# Patient Record
(demographics unavailable — no encounter records)

---

## 2017-09-25 NOTE — CT REPORT
EXAM:

CT HEAD

 

EXAM DATE: 9/25/2017 01:11 AM.

 

CLINICAL HISTORY: Right-sided weakness, now improving.

 

COMPARISON: Brain CT from 08/30/2015.

 

TECHNIQUE: Multiaxial CT images were obtained from the foramen magnum to the vertex. IV contrast: Non
e. Reformats: Coronal.

 

In accordance with CT protocol optimization, one or more of the following dose reduction techniques w
ere utilized for this exam: automated exposure control, adjustment of mA and/or KV based on patient s
ize, or use of iterative reconstructive technique.

 

FINDINGS:

Parenchyma: No intraparenchymal hemorrhage. No evidence of mass, midline shift, or CT findings of acu
te infarction. A small focus of encephalomalacia is again noted in the left frontal lobe. Gray-white 
differentiation is distinct.

 

Extraaxial Spaces: Normal for age. No subdural or epidural collections identified.

 

Ventricles: Normal in size and position.

 

Sinuses: Imaged paranasal sinuses, orbits, and mastoids show no significant abnormality.

 

Bones: No evidence of fracture or calvarial defect.

 

Other: Mild intracranial atherosclerosis is noted.

 

IMPRESSION: 

 

No acute intracranial process.

 

[facility]

Referring Provider Line: [phone]

 

SITE ID: [id number]

## 2017-09-25 NOTE — CT REPORT
EXAM:

CT ANGIOGRAM HEAD

 

EXAM DATE: 9/25/2017 07:01 AM.

 

CLINICAL HISTORY: Right sided weakness.

 

COMPARISON: Noncontrast CT head and CTA of the neck performed separately.

 

TECHNIQUE: Routine helical CTA imaging was performed through the head. IV Contrast: Yes. 80 cc Isovue
 300 Reconstructions: Routine multiplanar 3D MIP reconstructions. NASCET Criteria are used for stenos
is measurements.

 

In accordance with CT protocol optimization, one or more of the following dose reduction techniques w
ere utilized for this exam: automated exposure control, adjustment of mA and/or KV based on patient s
ize, or use of iterative reconstructive technique.

 

FINDINGS:

Noncontrast CT head:

Performed and dictated separately.

 

Postcontrast CT head:

No abnormal enhancement.

 

CTA of the head:

Anterior Circulation: Moderate atherosclerosis right carotid siphon, maximal stenosis 20%. Moderate a
therosclerosis left carotid siphon, maximal stenosis 20%. Severe stenosis mid M1 segment left MCA (fo
r example series 4 image 102, series 8 image 65) with likely greater than 90% stenosis. The right mid
dle cerebral artery, and anterior cerebral arteries (ASH) are patent bilaterally. The anterior commun
icating artery (A-COM) appears patent.  No aneurysms, stenoses, or anatomic anomalies evident.

 

Posterior Circulation: The superior vertebral artery, basilar, and posterior cerebral arteries (PCA) 
are patent. No aneurysms, stenoses, or anomalies evident. The P1 segments of the PCAs bilaterally are
 small. Prominent posterior communicating arteries bilaterally primarily supplied the PCAs which are 
otherwise unremarkable.

 

Other: Patent dural venous sinuses.

 

 

IMPRESSION: 

1. Noncontrast CT head has been performed and dictated separately. No abnormal enhancement on the pos
tcontrast CT head. CTA of the neck is dictated separately.

 

2. Severe stenosis mid M1 segment left MCA (for example series 4 image 102, series 8 image 65) with l
ikely greater than 90% stenosis.

 

3. Moderate atherosclerosis right carotid siphon, maximal stenosis 20%. 

 

4. Moderate atherosclerosis left carotid siphon, maximal stenosis 20%.

 

5. Near fetal origin of the PCAs bilaterally.

 

[facility]

Referring Provider Line: [phone]

 

SITE ID: [id number]

## 2017-09-25 NOTE — CT PRELIMINARY REPORT
Accession: [id number]

Exam: CT Head W/O

 

IMPRESSION: 

 

No acute intracranial process.

 

[facility]

 

SITE ID: [id number]

## 2017-09-25 NOTE — CT PRELIMINARY REPORT
Accession: [id number]

Exam: CT Neck Angio

 

IMPRESSION: 

1. No evidence of high-grade stenosis, dissection, occlusion, aneurysm, or vascular malformation with
in the arteries of the neck. Concurrently obtained CTA of the head is dictated separately.

 

2. Mild atherosclerosis right carotid bifurcation and right carotid bulb with maximal stenosis of 10-
20%, mild by NASCET criteria

 

3. Moderate atherosclerosis left carotid bifurcation and left carotid bulb with maximal narrowing of 
10-20%, mild by NASCET criteria .

 

[facility]

 

SITE ID: [id number]

## 2017-09-25 NOTE — CT PRELIMINARY REPORT
Accession: [id number]

Exam: CT Head Angio

 

IMPRESSION: 

1. Noncontrast CT head has been performed and dictated separately. No abnormal enhancement on the pos
tcontrast CT head. CTA of the neck is dictated separately.

 

2. Severe stenosis mid M1 segment left MCA (for example series 4 image 102, series 8 image 65) with l
ikely greater than 90% stenosis.

 

3. Moderate atherosclerosis right carotid siphon, maximal stenosis 20%. 

 

4. Moderate atherosclerosis left carotid siphon, maximal stenosis 20%.

 

5. Near fetal origin of the PCAs bilaterally.

 

[facility]

 

SITE ID: [id number]

## 2017-09-25 NOTE — DISCHARGE PLAN
Discharge Plan


Disposition: 01 Home, Self Care


Condition: Good


Prescriptions: 


Aspirin [Roberto] 325 mg PO DAILYWM #30 tablet


Diet: Cardiac


Activity Restrictions: No Restrictions


Shower Restrictions: No


Driving Restrictions: No


Weight Bearing: Full Weight


Instruction Topics:  Stroke Risk Factors


Additional Instructions or Follow Up instructions: 


PLEASE SEE YOUR PRIMARY CARE PROVIDER THIS WEEK AND LET THEM KNOW YOU WERE IN 

THE HOSPITAL.





CONTINUE TO TAKE ALL HOME MEDICATIONS AS PRESCRIBED. YOUR RESULTS OF THE MRI 

MRA CAN BE REQUESTED BY YOUR PRIMARY CARE PROVIDER





YOU NEED TO CONTINUE ON A HEART HEALTHY LOW FAT DIET





YOU WILL NEED TO CONTINUE ON AN ASPIRIN AND STATIN MEDICATIONS





RETURN TO THE ER IF SYMPTOMS CONTINUE OR YOU HAVE CHEST PAIN OR SHORTNESS OF 

BREATH


No Smoking: If you smoke, Please STOP!  Call [phone] for help.

## 2017-09-25 NOTE — ED PHYSICIAN DOCUMENTATION
PD HPI HEADACHE





- Stated complaint


Stated Complaint: NEURO





- Chief complaint


Chief Complaint: Neuro





- History obtained from


History obtained from: Patient, EMS





- History of Present Illness


Timing - onset: How many hours ago (2-3)


Timing - onset during: Rest


Timing - details: Abrupt onset, Still present (improved quite a bit but does 

not feel completely resolved, per patient.)


Worst headache ever?: No: Worst headache ever?


Location: Back, Right


Quality: Throbbing, Aching


Associated symptoms: Weakness (felt some weakness of right arm and leg, with 

feeling fumbly use.).  No: Fever, Stiff neck, Nausea, Vomiting, Syncope, Seizure

, Eye pain


Improved by: No: Rest


Worsened by: No: Light, Noise


Contributing factors: Hypertension.  No: Anticoagulated, Recent illness, Trauma


Similar symptoms before: Diagnosis (CVA several years ago and then TIA epiosdes

, with last one 2015. Had been feeling okay recently.)


Recently seen: Not recently seen





Review of Systems


Constitutional: denies: Fever, Chills


Eyes: denies: Loss of vision, Decreased vision, Photophobia


Ears: denies: Loss of hearing


Nose: denies: Rhinorrhea / runny nose, Congestion


Throat: denies: Sore throat


Cardiac: denies: Chest pain / pressure, Palpitations


Respiratory: denies: Dyspnea, Cough


GI: denies: Abdominal Pain, Nausea, Vomiting, Diarrhea


Skin: denies: Rash, Lesions


Neurologic: reports: Focal weakness, Numbness, Headache.  denies: Near syncope, 

Confused, Altered mental status, Head injury


Psychiatric: denies: Anxiety, Insomnia


Endocrine: denies: Weight loss, Easy bruising / bleeding


Immunocompromised: denies: Immunocompromised





PD PAST MEDICAL HISTORY





- Past Medical History


Cardiovascular: Hypertension, High cholesterol


Respiratory: CPAP use


Neuro: CVA


Endocrine/Autoimmune: None


GI: None


: Other


HEENT: None


Psych: Anxiety, Panic attacks


Musculoskeletal: None


Derm: None





- Past Surgical History


Past Surgical History: Yes


Ortho: ACL reconstruction, Shoulder arthroplasty, Arthroscopic surgery, Spine 

surgery





- Present Medications


Home Medications: 


 Ambulatory Orders











 Medication  Instructions  Recorded  Confirmed


 


Citalopram [CeleXA] 40 mg PO DAILY 03/29/14 09/25/17


 


hydroCHLOROthiazide [Hydrodiuril] 25 mg PO DAILY 03/29/14 09/25/17


 


Atorvastatin [Lipitor] 40 mg PO QPM 06/06/14 09/25/17


 


Clonazepam [Klonopin] 1 mg PO PRN PRN 06/06/14 09/25/17


 


Omega-3 Fatty Acids/Fish Oil [Fish 1,000 mg PO DAILY 06/06/14 09/25/17





Oil Softgel]   


 


Prazosin HCl 2 mg PO DAILY 06/06/14 09/25/17


 


Aspirin [Roberto] 325 mg PO DAILYWM #30 tablet 09/25/17 


 


Buspirone HCl 5 mg PO DAILY 09/25/17 09/25/17


 


Cyclosporine [Restasis] 1 drops TID 09/25/17 09/25/17














- Allergies


Allergies/Adverse Reactions: 


 Allergies











Allergy/AdvReac Type Severity Reaction Status Date / Time


 


No Known Drug Allergies Allergy   Verified 09/25/17 00:14














- Social History


Does the pt smoke?: No


Smoking Status: Never smoker


Does the pt drink ETOH?: No


Does the pt have substance abuse?: No





- Family History


Family history: reports: Non contributory





- Immunizations


Immunizations are current?: Yes





- POLST


Patient has POLST: No


POLST Status: Full Code





PD ED PE NORMAL





- Vitals


Vital signs reviewed: Yes





- General


General: Alert and oriented X 3, No acute distress, Well developed/nourished





- HEENT


HEENT: Ears normal, Moist mucous membranes, Pharynx benign





- Neck


Neck: Supple, no meningeal sign, No adenopathy, No bruit





- Cardiac


Cardiac: RRR, No murmur





- Respiratory


Respiratory: Clear bilaterally





- Abdomen


Abdomen: Soft, Non tender





- Male 


Male : Deferred





- Rectal


Rectal: Deferred





- Back


Back: No CVA TTP





- Derm


Derm: Normal color, Warm and dry, No rash





- Neuro


Neuro: Alert and oriented X 3, CNs 2-12 intact, No sensory deficit, Normal 

speech, Other (mild weakness and incoordination right arm and leg. )





- Psych


Psych: Normal mood, Normal affect





Results





- Vitals


Vitals: 


 Oxygen











O2 Source                      Room air

















- Labs


Labs: 


 Laboratory Tests











  09/25/17 09/25/17





  00:55 00:55


 


WBC  6.7 


 


RBC  4.92 


 


Hgb  14.5 


 


Hct  42.6 


 


MCV  86.6 


 


MCH  29.6 


 


MCHC  34.1 


 


RDW  13.1 


 


Plt Count  165 


 


MPV  7.9 


 


Neut #  3.0 


 


Lymph #  3.2 


 


Mono #  0.5 


 


Eos #  0.0 


 


Baso #  0.0 


 


Absolute Nucleated RBC  0.01 


 


Nucleated RBCs  0.1 


 


Sodium   138


 


Potassium   3.5


 


Chloride   100 L


 


Carbon Dioxide   28


 


Anion Gap   10.0


 


BUN   11


 


Creatinine   1.0


 


Estimated GFR (MDRD)   93


 


Glucose   102 H


 


Calcium   8.9


 


Magnesium   1.8


 


Total Bilirubin   0.7


 


AST   26


 


ALT   25


 


Alkaline Phosphatase   51


 


Total Protein   7.2


 


Albumin   4.0


 


Globulin   3.2


 


Albumin/Globulin Ratio   1.3


 


Lipase   35














- Rads (name of study)


  ** head CT


Radiology: Prelim report reviewed (no bleed; no acute changes noted. )





PD MEDICAL DECISION MAKING





- ED course


Complexity details: reviewed old records, reviewed results, considered 

differential (TIA episodes in the past, but not for about 2 years. Had weakness 

right side but is not feeling completely back to baseline. To place in [facility] for 

neuro checks and presume MRI in AM to assess for new CVA. ), d/w patient





Departure





- Departure


Disposition: ED Place in Observation


Clinical Impression: 


 Right sided weakness





TIA (transient ischemic attack)


Qualifiers:


 Transient cerebral ischemia type: unspecified Qualified Code(s): G45.9 - 

Transient cerebral ischemic attack, unspecified





Condition: Good


Record reviewed to determine appropriate education?: Yes


Discharge Date/Time: 09/25/17 03:30

## 2017-09-25 NOTE — MRI REPORT
EXAM:

MRI BRAIN WITHOUT CONTRAST

 

EXAM DATE: 9/25/2017 04:30 PM.

 

CLINICAL HISTORY: Right sided weakness.

 

COMPARISON: CT angiogram head and neck from today. CT head from today.

 

TECHNIQUE: Multiplanar, multisequence T1-weighted and fluid-sensitive MR sequences of the brain were 
performed. Sequences optimized for rapid evaluation secondary to patient claustrophobia. Other: None.
 IV Contrast: None.

 

FINDINGS:

 

 

No cerebellar tonsillar ectopia is present.

 

No abnormal diffusion signal or magnetic susceptibility is identified in the brain parenchyma.

 

There is encephalomalacia and gliosis seen superiorly involving the cortex and white matter of the so
mewhat lateral aspect of the postcentral gyrus on the left.

 

Subcortical FLAIR hyperintensities are seen in the cerebral hemisphere white matter bilaterally. Thes
e are greater number on the right relative to left. No abnormal FLAIR hyperintense signal is seen in 
either cerebellum or in the brainstem.

 

There is motion degradation on some of the imaging sequences.

 

 

 

IMPRESSION: 

1. No acute CVA is present on diffusion weighted images. 

2. A small remote ischemic event is seen involving the cortex and white matter of the postcentral gyr
us on the left. 

3. Scattered subcortical and deep white matter FLAIR hyperintensities in the cerebral hemisphere whit
e matter bilaterally are nonspecific. Commonly, these are secondary to small vessel ischemic change.

 

[facility]

Referring Provider Line: [phone]

 

SITE ID: [id number]

## 2017-09-25 NOTE — CT REPORT
EXAM:

CT ANGIOGRAM NECK

 

EXAM DATE: 9/25/2017 06:59 AM.

 

CLINICAL HISTORY: Right sided weakness.

 

COMPARISON: Noncontrast CT head 09/25/2017

 

TECHNIQUE: Routine axial helical imaging was performed from the skull base through the aortic arch. I
V Contrast: Yes. 80 cc Isovue 300 Reconstructions: Routine multiplanar 3D MIP reconstructions. Evalua
tion of arterial stenosis is based on a NASCET method of measurement.

 

In accordance with CT protocol optimization, one or more of the following dose reduction techniques w
ere utilized for this exam: automated exposure control, adjustment of mA and/or KV based on patient s
ize, or use of iterative reconstructive technique.

 

FINDINGS:

Right Carotid: Mild atherosclerosis right carotid bifurcation and right carotid bulb with maximal rishabh
nosis of 10-20%, mild by NASCET criteria The common carotid, internal carotid, and external carotid a
rteries are patent. No evidence of dissection

 

Left Carotid: Moderate atherosclerosis left carotid bifurcation and left carotid bulb with maximal na
rrowing of 10-20%, mild by NASCET criteria . The common carotid, internal carotid, and external carot
id arteries are patent. No evidence of dissection.

 

Vertebrals: The left vertebral artery is dominant. The vertebrobasilar system shows no stenoses.

 

Intracranial Circulation: Concurrently obtained CTA of the head is dictated separately.

 

Other: The visualized lung apices are clear. Moderate multilevel degenerative spondylosis status post
 C4-C5 ACDF. No evidence of acute fracture or malalignment. The visualized soft tissues of the neck d
emonstrate no acute abnormality.

 

 

IMPRESSION: 

1. No evidence of high-grade stenosis, dissection, occlusion, aneurysm, or vascular malformation with
in the arteries of the neck. Concurrently obtained CTA of the head is dictated separately.

 

2. Mild atherosclerosis right carotid bifurcation and right carotid bulb with maximal stenosis of 10-
20%, mild by NASCET criteria

 

3. Moderate atherosclerosis left carotid bifurcation and left carotid bulb with maximal narrowing of 
10-20%, mild by NASCET criteria .

 

[facility]

Referring Provider Line: [phone]

 

SITE ID: [id number]

## 2023-10-24 NOTE — MRI PRELIMINARY REPORT
Accession: [id number]

Exam: MRI Brain W/O

 

IMPRESSION: 

1. No acute CVA is present on diffusion weighted images. 

2. A small remote ischemic event is seen involving the cortex and white matter of the postcentral gyr
us on the left. 

3. Scattered subcortical and deep white matter FLAIR hyperintensities in the cerebral hemisphere whit
e matter bilaterally are nonspecific. Commonly, these are secondary to small vessel ischemic change.

 

[facility]

 

SITE ID: [id number]